# Patient Record
Sex: MALE | Race: WHITE | Employment: FULL TIME | URBAN - METROPOLITAN AREA
[De-identification: names, ages, dates, MRNs, and addresses within clinical notes are randomized per-mention and may not be internally consistent; named-entity substitution may affect disease eponyms.]

---

## 2022-07-24 ENCOUNTER — HOSPITAL ENCOUNTER (EMERGENCY)
Age: 52
Discharge: HOME | End: 2022-07-25
Attending: EMERGENCY MEDICINE
Payer: COMMERCIAL

## 2022-07-24 VITALS
HEART RATE: 118 BPM | HEIGHT: 70 IN | OXYGEN SATURATION: 96 % | BODY MASS INDEX: 31.03 KG/M2 | SYSTOLIC BLOOD PRESSURE: 166 MMHG | TEMPERATURE: 98.3 F | WEIGHT: 216.71 LBS | RESPIRATION RATE: 18 BRPM | DIASTOLIC BLOOD PRESSURE: 99 MMHG

## 2022-07-24 DIAGNOSIS — N20.1 URETEROLITHIASIS: Primary | ICD-10-CM

## 2022-07-24 DIAGNOSIS — N23 RENAL COLIC: ICD-10-CM

## 2022-07-24 PROCEDURE — 85025 COMPLETE CBC W/AUTO DIFF WBC: CPT

## 2022-07-24 PROCEDURE — 80053 COMPREHEN METABOLIC PANEL: CPT

## 2022-07-24 PROCEDURE — 81001 URINALYSIS AUTO W/SCOPE: CPT

## 2022-07-24 PROCEDURE — 36415 COLL VENOUS BLD VENIPUNCTURE: CPT

## 2022-07-24 PROCEDURE — 99284 EMERGENCY DEPT VISIT MOD MDM: CPT

## 2022-07-25 ENCOUNTER — APPOINTMENT (OUTPATIENT)
Dept: CT IMAGING | Age: 52
End: 2022-07-25
Attending: EMERGENCY MEDICINE
Payer: COMMERCIAL

## 2022-07-25 LAB
ALBUMIN SERPL-MCNC: 4.4 G/DL (ref 3.5–5)
ALBUMIN/GLOB SERPL: 1.3 {RATIO} (ref 1.1–2.2)
ALP SERPL-CCNC: 36 U/L (ref 45–117)
ALT SERPL-CCNC: 34 U/L (ref 12–78)
ANION GAP SERPL CALC-SCNC: 11 MMOL/L (ref 5–15)
APPEARANCE UR: CLEAR
AST SERPL-CCNC: 15 U/L (ref 15–37)
BACTERIA URNS QL MICRO: NEGATIVE /HPF
BASOPHILS # BLD: 0.1 K/UL (ref 0–0.1)
BASOPHILS NFR BLD: 1 % (ref 0–1)
BILIRUB SERPL-MCNC: 0.4 MG/DL (ref 0.2–1)
BILIRUB UR QL: NEGATIVE
BUN SERPL-MCNC: 18 MG/DL (ref 6–20)
BUN/CREAT SERPL: 11 (ref 12–20)
CALCIUM SERPL-MCNC: 9.5 MG/DL (ref 8.5–10.1)
CHLORIDE SERPL-SCNC: 102 MMOL/L (ref 97–108)
CO2 SERPL-SCNC: 25 MMOL/L (ref 21–32)
COLOR UR: ABNORMAL
CREAT SERPL-MCNC: 1.6 MG/DL (ref 0.7–1.3)
DIFFERENTIAL METHOD BLD: ABNORMAL
EOSINOPHIL # BLD: 0.1 K/UL (ref 0–0.4)
EOSINOPHIL NFR BLD: 1 % (ref 0–7)
EPITH CASTS URNS QL MICRO: NORMAL /LPF
ERYTHROCYTE [DISTWIDTH] IN BLOOD BY AUTOMATED COUNT: 13.3 % (ref 11.5–14.5)
GLOBULIN SER CALC-MCNC: 3.4 G/DL (ref 2–4)
GLUCOSE SERPL-MCNC: 129 MG/DL (ref 65–100)
GLUCOSE UR STRIP.AUTO-MCNC: NEGATIVE MG/DL
HCT VFR BLD AUTO: 47.2 % (ref 36.6–50.3)
HGB BLD-MCNC: 15.9 G/DL (ref 12.1–17)
HGB UR QL STRIP: ABNORMAL
IMM GRANULOCYTES # BLD AUTO: 0.1 K/UL (ref 0–0.04)
IMM GRANULOCYTES NFR BLD AUTO: 0 % (ref 0–0.5)
KETONES UR QL STRIP.AUTO: NEGATIVE MG/DL
LEUKOCYTE ESTERASE UR QL STRIP.AUTO: NEGATIVE
LYMPHOCYTES # BLD: 2.1 K/UL (ref 0.8–3.5)
LYMPHOCYTES NFR BLD: 16 % (ref 12–49)
MCH RBC QN AUTO: 28.2 PG (ref 26–34)
MCHC RBC AUTO-ENTMCNC: 33.7 G/DL (ref 30–36.5)
MCV RBC AUTO: 83.8 FL (ref 80–99)
MONOCYTES # BLD: 1 K/UL (ref 0–1)
MONOCYTES NFR BLD: 7 % (ref 5–13)
NEUTS SEG # BLD: 9.9 K/UL (ref 1.8–8)
NEUTS SEG NFR BLD: 75 % (ref 32–75)
NITRITE UR QL STRIP.AUTO: NEGATIVE
NRBC # BLD: 0 K/UL (ref 0–0.01)
NRBC BLD-RTO: 0 PER 100 WBC
PH UR STRIP: 6 [PH] (ref 5–8)
PLATELET # BLD AUTO: 232 K/UL (ref 150–400)
PMV BLD AUTO: 10.5 FL (ref 8.9–12.9)
POTASSIUM SERPL-SCNC: 4 MMOL/L (ref 3.5–5.1)
PROT SERPL-MCNC: 7.8 G/DL (ref 6.4–8.2)
PROT UR STRIP-MCNC: NEGATIVE MG/DL
RBC # BLD AUTO: 5.63 M/UL (ref 4.1–5.7)
RBC #/AREA URNS HPF: NORMAL /HPF (ref 0–5)
SODIUM SERPL-SCNC: 138 MMOL/L (ref 136–145)
SP GR UR REFRACTOMETRY: 1.02 (ref 1–1.03)
UROBILINOGEN UR QL STRIP.AUTO: 0.2 EU/DL (ref 0.2–1)
WBC # BLD AUTO: 13.1 K/UL (ref 4.1–11.1)
WBC URNS QL MICRO: NORMAL /HPF (ref 0–4)

## 2022-07-25 PROCEDURE — 74011250636 HC RX REV CODE- 250/636: Performed by: EMERGENCY MEDICINE

## 2022-07-25 PROCEDURE — 74176 CT ABD & PELVIS W/O CONTRAST: CPT

## 2022-07-25 RX ORDER — ONDANSETRON 4 MG/1
4 TABLET, ORALLY DISINTEGRATING ORAL
Qty: 12 TABLET | Refills: 0 | Status: SHIPPED | OUTPATIENT
Start: 2022-07-25

## 2022-07-25 RX ORDER — KETOROLAC TROMETHAMINE 10 MG/1
10 TABLET, FILM COATED ORAL
Qty: 10 TABLET | Refills: 0 | Status: SHIPPED | OUTPATIENT
Start: 2022-07-25

## 2022-07-25 RX ORDER — TAMSULOSIN HYDROCHLORIDE 0.4 MG/1
0.4 CAPSULE ORAL DAILY
Qty: 15 CAPSULE | Refills: 0 | Status: SHIPPED | OUTPATIENT
Start: 2022-07-25 | End: 2022-08-09

## 2022-07-25 RX ADMIN — SODIUM CHLORIDE 1000 ML: 9 INJECTION, SOLUTION INTRAVENOUS at 01:30

## 2022-07-25 NOTE — ED NOTES
The patient was discharged home by provider and Alejandro Cao RN in stable condition, accompanied by parent/guardian . The patient is alert and oriented, is in no respiratory distress and has vital signs within normal limits . The patient's diagnosis, condition and treatment were explained to patient. The patient expressed understanding. Three prescriptions given to pt. No work/school note given to pt. A discharge plan has been developed. A  was not involved in the process. Aftercare instructions were given to the patient. IV removed with no complication. Pt transported self home.

## 2022-07-25 NOTE — ED TRIAGE NOTES
KELY castro pain onset 1-2 weeks ago, acutely worse this evening with cold sweats and dizziness, one episode of vomiting.

## 2022-07-25 NOTE — ED PROVIDER NOTES
Date of Service:  7/24/2022    Patient:  Shweta Turner    Chief Complaint:  Abdominal Pain       HPI:  Shweta Turner is a 47 y.o.  male who presents for evaluation of abdominal discomfort. Left-sided lower quadrant discomfort for several weeks on and off. Tonight, 10 out of 10 discomfort in the same area causing the patient with lightheadedness dizziness cold sweats and vomiting. This happened several times this evening. Currently pain is a 1 out of 10. No back pain. No urinary complaints, no other GI complaints. No history of diverticulitis or kidney stones. Patient otherwise denies other acute complaints or modifying factors. History reviewed. No pertinent past medical history. History reviewed. No pertinent surgical history. History reviewed. No pertinent family history. Social History     Socioeconomic History    Marital status: Not on file     Spouse name: Not on file    Number of children: Not on file    Years of education: Not on file    Highest education level: Not on file   Occupational History    Not on file   Tobacco Use    Smoking status: Never    Smokeless tobacco: Not on file   Vaping Use    Vaping Use: Never used   Substance and Sexual Activity    Alcohol use: Never    Drug use: Not on file    Sexual activity: Not on file   Other Topics Concern    Not on file   Social History Narrative    Not on file     Social Determinants of Health     Financial Resource Strain: Not on file   Food Insecurity: Not on file   Transportation Needs: Not on file   Physical Activity: Not on file   Stress: Not on file   Social Connections: Not on file   Intimate Partner Violence: Not on file   Housing Stability: Not on file         ALLERGIES: Patient has no allergy information on record. Review of Systems   Gastrointestinal:  Positive for abdominal pain, nausea and vomiting. Neurological:  Positive for light-headedness. All other systems reviewed and are negative.     Vitals:    07/24/22 2341 BP: (!) 166/99   Pulse: (!) 118   Resp: 18   Temp: 98.3 °F (36.8 °C)   SpO2: 96%   Weight: 98.3 kg (216 lb 11.4 oz)   Height: 5' 10\" (1.778 m)            Physical Exam  Vitals and nursing note reviewed. Constitutional:       Appearance: He is well-developed. HENT:      Head: Normocephalic and atraumatic. Cardiovascular:      Rate and Rhythm: Regular rhythm. Tachycardia present. Pulmonary:      Effort: Pulmonary effort is normal.      Breath sounds: Normal breath sounds. Abdominal:      General: There is no distension. Tenderness: There is no abdominal tenderness. There is no right CVA tenderness or left CVA tenderness. Skin:     General: Skin is warm. Capillary Refill: Capillary refill takes less than 2 seconds. Findings: No rash. Neurological:      Mental Status: He is alert and oriented to person, place, and time. Psychiatric:         Mood and Affect: Mood normal.        [facility]    ED Course as of 07/25/22 0351   Mon Jul 25, 2022   0010 Blood(!): MODERATE [GG]      ED Course User Index  [GG] Angel Benson, DO     VITAL SIGNS:  No data found.       LABS:  Recent Results (from the past 6 hour(s))   URINALYSIS W/ RFLX MICROSCOPIC    Collection Time: 07/24/22 11:43 PM   Result Value Ref Range    Color YELLOW/STRAW      Appearance CLEAR CLEAR      Specific gravity 1.020 1.003 - 1.030      pH (UA) 6.0 5.0 - 8.0      Protein Negative NEG mg/dL    Glucose Negative NEG mg/dL    Ketone Negative NEG mg/dL    Bilirubin Negative NEG      Blood MODERATE (A) NEG      Urobilinogen 0.2 0.2 - 1.0 EU/dL    Nitrites Negative NEG      Leukocyte Esterase Negative NEG     URINE MICROSCOPIC ONLY    Collection Time: 07/24/22 11:43 PM   Result Value Ref Range    WBC 0-4 0 - 4 /hpf    RBC 0-5 0 - 5 /hpf    Epithelial cells FEW FEW /lpf    Bacteria Negative NEG /hpf   CBC WITH AUTOMATED DIFF    Collection Time: 07/24/22 11:47 PM   Result Value Ref Range    WBC 13.1 (H) 4.1 - 11.1 K/uL    RBC 5.63 4.10 - 5.70 M/uL    HGB 15.9 12.1 - 17.0 g/dL    HCT 47.2 36.6 - 50.3 %    MCV 83.8 80.0 - 99.0 FL    MCH 28.2 26.0 - 34.0 PG    MCHC 33.7 30.0 - 36.5 g/dL    RDW 13.3 11.5 - 14.5 %    PLATELET 019 147 - 890 K/uL    MPV 10.5 8.9 - 12.9 FL    NRBC 0.0 0.0  WBC    ABSOLUTE NRBC 0.00 0.00 - 0.01 K/uL    NEUTROPHILS 75 32 - 75 %    LYMPHOCYTES 16 12 - 49 %    MONOCYTES 7 5 - 13 %    EOSINOPHILS 1 0 - 7 %    BASOPHILS 1 0 - 1 %    IMMATURE GRANULOCYTES 0 0 - 0.5 %    ABS. NEUTROPHILS 9.9 (H) 1.8 - 8.0 K/UL    ABS. LYMPHOCYTES 2.1 0.8 - 3.5 K/UL    ABS. MONOCYTES 1.0 0.0 - 1.0 K/UL    ABS. EOSINOPHILS 0.1 0.0 - 0.4 K/UL    ABS. BASOPHILS 0.1 0.0 - 0.1 K/UL    ABS. IMM. GRANS. 0.1 (H) 0.00 - 0.04 K/UL    DF AUTOMATED     METABOLIC PANEL, COMPREHENSIVE    Collection Time: 07/24/22 11:47 PM   Result Value Ref Range    Sodium 138 136 - 145 mmol/L    Potassium 4.0 3.5 - 5.1 mmol/L    Chloride 102 97 - 108 mmol/L    CO2 25 21 - 32 mmol/L    Anion gap 11 5 - 15 mmol/L    Glucose 129 (H) 65 - 100 mg/dL    BUN 18 6 - 20 MG/DL    Creatinine 1.60 (H) 0.70 - 1.30 MG/DL    BUN/Creatinine ratio 11 (L) 12 - 20      GFR est AA 55 (L) >60 ml/min/1.73m2    GFR est non-AA 45 (L) >60 ml/min/1.73m2    Calcium 9.5 8.5 - 10.1 MG/DL    Bilirubin, total 0.4 0.2 - 1.0 MG/DL    ALT (SGPT) 34 12 - 78 U/L    AST (SGOT) 15 15 - 37 U/L    Alk. phosphatase 36 (L) 45 - 117 U/L    Protein, total 7.8 6.4 - 8.2 g/dL    Albumin 4.4 3.5 - 5.0 g/dL    Globulin 3.4 2.0 - 4.0 g/dL    A-G Ratio 1.3 1.1 - 2.2          IMAGING:  CT ABD PELV WO CONT   Final Result   Tiny distal left ureteral calculus. Medications During Visit:  Medications   sodium chloride 0.9 % bolus infusion 1,000 mL (0 mL IntraVENous IV Completed 7/25/22 6528)         DECISION MAKING:  Anderson Cesar is a 47 y.o. male who comes in as above. Ureterolithiasis. Will discharge home with medicines as below. Patient's pain is resolved      IMPRESSION:  1. Ureterolithiasis    2.  Renal colic DISPOSITION:  Discharged      Discharge Medication List as of 7/25/2022  2:19 AM        START taking these medications    Details   ketorolac (TORADOL) 10 mg tablet Take 1 Tablet by mouth every six (6) hours as needed for Pain., Print, Disp-10 Tablet, R-0      ondansetron (ZOFRAN ODT) 4 mg disintegrating tablet Take 1 Tablet by mouth every eight (8) hours as needed for Nausea for up to 12 doses. , Print, Disp-12 Tablet, R-0      tamsulosin (Flomax) 0.4 mg capsule Take 1 Capsule by mouth in the morning for 15 days. , Print, Disp-15 Capsule, R-0              Follow-up Information       Follow up With Specialties Details Why [facility] [facility],[address]  Schedule an appointment as soon as possible for a visit   [address] [place] [id number]  [facility] Schedule an appointment as soon as possible for a visit   [address]  [facility] [phone]              The patient is asked to follow-up with their primary care provider in the next several days. They are to call tomorrow for an appointment. The patient is asked to return promptly for any increased concerns or worsening of symptoms. They can return to this emergency department or any other emergency department.       Procedures

## 2024-02-29 ENCOUNTER — HOSPITAL ENCOUNTER (EMERGENCY)
Facility: HOSPITAL | Age: 54
Discharge: HOME | End: 2024-02-29
Attending: EMERGENCY MEDICINE
Payer: COMMERCIAL

## 2024-02-29 VITALS
SYSTOLIC BLOOD PRESSURE: 146 MMHG | BODY MASS INDEX: 39.37 KG/M2 | HEIGHT: 70 IN | OXYGEN SATURATION: 97 % | DIASTOLIC BLOOD PRESSURE: 94 MMHG | TEMPERATURE: 98.5 F | WEIGHT: 275 LBS | RESPIRATION RATE: 20 BRPM | HEART RATE: 98 BPM

## 2024-02-29 DIAGNOSIS — L76.21 POSTOPERATIVE HEMORRHAGE OF SKIN FOLLOWING DERMATOLOGIC PROCEDURE: Primary | ICD-10-CM

## 2024-02-29 PROCEDURE — 99282 EMERGENCY DEPT VISIT SF MDM: CPT

## 2024-02-29 ASSESSMENT — PAIN DESCRIPTION - PAIN TYPE: TYPE: ACUTE PAIN

## 2024-02-29 ASSESSMENT — PAIN - FUNCTIONAL ASSESSMENT
PAIN_FUNCTIONAL_ASSESSMENT: NONE - DENIES PAIN
PAIN_FUNCTIONAL_ASSESSMENT: NONE - DENIES PAIN
PAIN_FUNCTIONAL_ASSESSMENT: ACTIVITIES ARE NOT PREVENTED

## 2024-02-29 ASSESSMENT — PAIN SCALES - GENERAL: PAINLEVEL_OUTOF10: 0

## 2024-03-01 NOTE — ED TRIAGE NOTES
Pt complaining of uncontrolled bleeding from a surgical wound on his back. Pt sts he was seen at plastic surgery office today and they cored out the cyst on his back. Pt sts started bleeding uncontrollably when during the first dressing change. Pt denies pain. Large amount of blood noted on shirt, Heavy bleeding noted to surgical wound. Pressure dressing applied, Dr Butler at bedside

## 2024-03-01 NOTE — ED PROVIDER NOTES
EMERGENCY DEPARTMENT PHYSICIAN NOTE     Patient: Hitesh Mares     Time of Service: 2/29/2024  7:22 PM     Chief complaint:   Chief Complaint   Patient presents with    Wound Check        HISTORY:  Patient is a 54 y.o. male who presents to the emergency department with complaints of ***       History reviewed. No pertinent past medical history.     History reviewed. No pertinent surgical history.     History reviewed. No pertinent family history.     Social History     Socioeconomic History    Marital status: Single     Spouse name: None    Number of children: None    Years of education: None    Highest education level: None   Tobacco Use    Smoking status: Never   Substance and Sexual Activity    Alcohol use: Never        Current Medications: Reviewed in chart.    Allergies: No Known Allergies       REVIEW OF SYSTEMS: See HPI for pertinent positives and negatives.      PHYSICAL EXAM:  BP (!) 146/94   Pulse 98   Temp 98.5 °F (36.9 °C) (Oral)   Resp 20   Ht 1.778 m (5' 10\")   Wt 124.7 kg (275 lb)   SpO2 97%   BMI 39.46 kg/m²    Physical Exam      ED Course:           ED physician interpretation of EKG: ***No STEMI.  See my interpretation of EKG in ED course above.    Laboratory Results:  Labs Reviewed - No data to display  ED physician interpretation of laboratory results: Documented in ED course    Imaging Results:  No orders to display     ED physician interpretation of imaging: Documented in ED course    Medications Given:  Medications - No data to display    Differential Diagnosis included but not limited to: ***    Medical Decision Making  The patient was placed into an examination in room.    Nursing notes were reviewed.    The patient was interviewed and an examination was completed by me with the above findings.      MDM:    This is a 54-year-old male who presents to the ED via POV for complaint of bleeding postprocedure.  Patient had a cyst removed of her surgery earlier today.  He states he  part for quite some time.  This time I did advise the patient to keep this in place and do not remove this until he follows up with the plastic surgeon.  I did advise him to follow-up with surgeon tomorrow.  Patient was agreeable with this plan.  Patient was discharged home            It is my clinical impression today patient presents for: Postoperative hemorrhage of following a skin procedure        I've discussed my findings, impression in detail with the patient at the bedside.  I have provided the opportunity to ask questions, and have addressed all questions at the bedside.    Expectations, return precautions verbalized at bedside.            At this time I do feel patient is stable for discharge. I feel no further laboratory evaluation/imaging is indicated. At this time patient will be discharged in stable and non toxic condition. Patient has been advised to return for new, worsening, concerning symptoms.  Patient had all their questions answered and were agreeable to this plan            * Document created with voice recognition software which can lead to typographical errors.          Procedures       DISPOSITION: Decision To Discharge 02/29/2024 09:59:11 PM    CLINICAL IMPRESSION:   1. Postoperative hemorrhage of skin following dermatologic procedure         Further personalized recommendations for outpatient care as below.    Key discharge instructions and summary of care provided in AVS:     Prescriptions provided to the patient:     Discharge Medication List as of 2/29/2024  9:59 PM           Song Butler DO   Emergency Medicine Attending Physician            Song Butler DO  03/10/24 0356

## 2024-03-01 NOTE — DISCHARGE INSTRUCTIONS
Routine appointments for health maintenance with a primary care provider are very important and emergency department visits are no substitute.  You should review all findings and test results from your visit today with your primary care physician.        Please follow-up with your plastic surgeon tomorrow morning.  Please leave bandage in place until you follow-up with them      Return to the emergency department for any new or concerning signs/symptoms or failure to improve.